# Patient Record
Sex: MALE | Race: AMERICAN INDIAN OR ALASKA NATIVE
[De-identification: names, ages, dates, MRNs, and addresses within clinical notes are randomized per-mention and may not be internally consistent; named-entity substitution may affect disease eponyms.]

---

## 2020-10-01 ENCOUNTER — HOSPITAL ENCOUNTER (EMERGENCY)
Dept: HOSPITAL 5 - ED | Age: 20
LOS: 1 days | Discharge: HOME | End: 2020-10-02
Payer: SELF-PAY

## 2020-10-01 DIAGNOSIS — F12.10: ICD-10-CM

## 2020-10-01 DIAGNOSIS — F17.200: ICD-10-CM

## 2020-10-01 DIAGNOSIS — R11.2: ICD-10-CM

## 2020-10-01 DIAGNOSIS — A08.4: ICD-10-CM

## 2020-10-01 DIAGNOSIS — Z79.899: ICD-10-CM

## 2020-10-01 DIAGNOSIS — K21.9: Primary | ICD-10-CM

## 2020-10-01 PROCEDURE — 96361 HYDRATE IV INFUSION ADD-ON: CPT

## 2020-10-01 PROCEDURE — 96375 TX/PRO/DX INJ NEW DRUG ADDON: CPT

## 2020-10-01 PROCEDURE — 80053 COMPREHEN METABOLIC PANEL: CPT

## 2020-10-01 PROCEDURE — 74177 CT ABD & PELVIS W/CONTRAST: CPT

## 2020-10-01 PROCEDURE — 85025 COMPLETE CBC W/AUTO DIFF WBC: CPT

## 2020-10-01 PROCEDURE — 36415 COLL VENOUS BLD VENIPUNCTURE: CPT

## 2020-10-01 PROCEDURE — 81001 URINALYSIS AUTO W/SCOPE: CPT

## 2020-10-01 PROCEDURE — 83690 ASSAY OF LIPASE: CPT

## 2020-10-01 PROCEDURE — 99284 EMERGENCY DEPT VISIT MOD MDM: CPT

## 2020-10-01 PROCEDURE — 96374 THER/PROPH/DIAG INJ IV PUSH: CPT

## 2020-10-02 VITALS — DIASTOLIC BLOOD PRESSURE: 61 MMHG | SYSTOLIC BLOOD PRESSURE: 139 MMHG

## 2020-10-02 LAB
ALBUMIN SERPL-MCNC: 5.9 G/DL (ref 3.9–5)
ALT SERPL-CCNC: 25 UNITS/L (ref 7–56)
BASOPHILS # (AUTO): 0 K/MM3 (ref 0–0.1)
BASOPHILS NFR BLD AUTO: 0.2 % (ref 0–1.8)
BILIRUB UR QL STRIP: (no result)
BLOOD UR QL VISUAL: (no result)
BUN SERPL-MCNC: 25 MG/DL (ref 9–20)
BUN/CREAT SERPL: 19 %
CALCIUM SERPL-MCNC: 11.9 MG/DL (ref 8.4–10.2)
EOSINOPHIL # BLD AUTO: 0 K/MM3 (ref 0–0.4)
EOSINOPHIL NFR BLD AUTO: 0 % (ref 0–4.3)
HCT VFR BLD CALC: 47.3 % (ref 35.5–45.6)
HEMOLYSIS INDEX: 4
HGB BLD-MCNC: 16.9 GM/DL (ref 11.8–15.2)
LYMPHOCYTES # BLD AUTO: 1.2 K/MM3 (ref 1.2–5.4)
LYMPHOCYTES NFR BLD AUTO: 7.5 % (ref 13.4–35)
MCHC RBC AUTO-ENTMCNC: 36 % (ref 32–34)
MCV RBC AUTO: 94 FL (ref 84–94)
MONOCYTES # (AUTO): 2 K/MM3 (ref 0–0.8)
MONOCYTES % (AUTO): 12.6 % (ref 0–7.3)
MUCOUS THREADS #/AREA URNS HPF: (no result) /HPF
PH UR STRIP: 5 [PH] (ref 5–7)
PLATELET # BLD: 314 K/MM3 (ref 140–440)
RBC # BLD AUTO: 5.04 M/MM3 (ref 3.65–5.03)
RBC #/AREA URNS HPF: 5 /HPF (ref 0–6)
UROBILINOGEN UR-MCNC: < 2 MG/DL (ref ?–2)
WBC #/AREA URNS HPF: 4 /HPF (ref 0–6)

## 2020-10-02 NOTE — CAT SCAN REPORT
CT ABDOMEN AND PELVIS WITH CONTRAST



HISTORY: Abdominal pain, nausea, vomiting



COMPARISON: None



TECHNIQUE: Routine abdominal and pelvic CT exam performed  following intravenous contrast administrat
ion. Patient received 100 mL IV Omnipaque 300. All CT scans at this location are performed using CT d
ose reduction for ALARA by means of automated exposure control.



FINDINGS:



CT ABDOMEN:

Lung Bases: No significant abnormality.

Liver: No significant abnormality.

Biliary: No significant abnormality.

Spleen: No significant abnormality.  Unenlarged.

Pancreas: No significant abnormality.

Adrenals: No significant abnormality.

Kidneys: No significant abnormality.

Lymphatics: No lymphadenopathy.

Vasculature: No significant abnormality. 

Bowel/Peritoneum: No significant abnormality. No free air. No free fluid. Appendix not visualized. No
 pericecal inflammation.



CT PELVIC:

: No significant abnormality.

Lymphatics: No lymphadenopathy.



Osseous Structures: No aggressive appearing osseous lesions.

Additional Findings: None



IMPRESSION:

1. No acute findings.



Signer Name: Jasvir Rey MD 

Signed: 10/2/2020 4:21 AM

Workstation Name: [id number]

## 2020-10-02 NOTE — EMERGENCY DEPARTMENT REPORT
ED N/V/D HPI





- General


Chief complaint: Abdominal Pain


Stated complaint: ABD PAIN


Source: patient


Mode of arrival: Ambulatory


Limitations: No Limitations





- History of Present Illness


Initial comments: 





Patient is a 20-year-old -American male with a history of GERD who prese

nts to the ED with complaint of acute onset persistent epigastric pain with 

intractable nausea and vomiting for the last 12 hours.  Patient states that he 

has not been able to keep anything down including water since the onset of the 

symptoms.  Patient states that last time he ate was over 24 hours ago.  Patient 

states that no one else at home is had similar symptoms.  Patient denies 

dizziness, syncope, chest pain, shortness of breath, diarrhea, dysuria, urinary 

frequency and urgency, hematemesis, hematochezia, headache, cough or hematuria 

and testicular pain.


MD complaint: nausea, vomiting, abdominal pain


-: Sudden, hour(s) (12)


Description of Vomiting: food contents, watery, bilious


Associated Abdominal Pain: Yes (epigastric)


Location: epigastric


Radiation: none


Severity: severe


Pain Scale: 7


Quality: cramping, sharp


Consistency: constant


Improves with: none


Worsens with: eating, vomiting


Context: possible food poisoning


Associated Symptoms: denies other symptoms, myalgias, loss of appetite, malaise,

nausea/vomiting.  denies: chest pain, cough, diaphoresis, fever/chills, 

headaches, rash, dysuria, shortness of breath, syncope, weakness, other





- Related Data


                                  Previous Rx's











 Medication  Instructions  Recorded  Last Taken  Type


 


Dicyclomine [Bentyl] 20 mg PO Q6H PRN #30 tablet 10/02/20 Unknown Rx


 


Famotidine [Pepcid] 20 mg PO Q12H #60 tablet 10/02/20 Unknown Rx


 


Ondansetron [Zofran Odt] 4 mg PO Q6HR PRN #20 tab.rapdis 10/02/20 Unknown Rx











                                    Allergies











Allergy/AdvReac Type Severity Reaction Status Date / Time


 


No Known Allergies Allergy   Unverified 10/02/20 00:05














ED Review of Systems


ROS: 


Stated complaint: ABD PAIN


Other details as noted in HPI





Constitutional: denies: chills, fever


Eyes: denies: eye pain, eye discharge, vision change


ENT: denies: ear pain, throat pain


Respiratory: denies: cough, shortness of breath, wheezing


Cardiovascular: denies: chest pain, palpitations


Endocrine: no symptoms reported


Gastrointestinal: abdominal pain, nausea, vomiting.  denies: diarrhea


Genitourinary: denies: urgency, dysuria


Musculoskeletal: denies: back pain, joint swelling, arthralgia


Skin: denies: rash, lesions


Neurological: denies: headache, weakness, paresthesias


Psychiatric: denies: anxiety, depression


Hematological/Lymphatic: denies: easy bleeding, easy bruising





ED Past Medical Hx





- Past Medical History


Previous Medical History?: Yes


Hx GERD: Yes


Additional medical history: Gastroenteritis





- Surgical History


Past Surgical History?: No





- Social History


Smoking Status: Current Every Day Smoker


Substance Use Type: Marijuana





- Medications


Home Medications: 


                                Home Medications











 Medication  Instructions  Recorded  Confirmed  Last Taken  Type


 


Dicyclomine [Bentyl] 20 mg PO Q6H PRN #30 tablet 10/02/20  Unknown Rx


 


Famotidine [Pepcid] 20 mg PO Q12H #60 tablet 10/02/20  Unknown Rx


 


Ondansetron [Zofran Odt] 4 mg PO Q6HR PRN #20 tab.rapdis 10/02/20  Unknown Rx














ED Physical Exam





- General


Limitations: No Limitations


General appearance: alert, in no apparent distress, anxious





- Head


Head exam: Present: atraumatic, normocephalic, normal inspection





- Eye


Eye exam: Present: normal appearance, PERRL, EOMI


Pupils: Present: normal accommodation





- ENT


ENT exam: Present: normal exam, normal orophraynx, mucous membranes moist, TM's 

normal bilaterally, normal external ear exam





- Neck


Neck exam: Present: normal inspection, full ROM.  Absent: tenderness





- Respiratory


Respiratory exam: Present: normal lung sounds bilaterally.  Absent: respiratory 

distress, wheezes, rales, stridor, chest wall tenderness, accessory muscle use, 

decreased breath sounds





- Cardiovascular


Cardiovascular Exam: Present: regular rate, normal rhythm, normal heart sounds. 

Absent: systolic murmur, diastolic murmur, rubs, gallop





- GI/Abdominal


GI/Abdominal exam: Present: soft, tenderness (epigastric tenderness), normal 

bowel sounds.  Absent: guarding, rebound, rigid, hyperactive bowel sounds, 

organomegaly





- Extremities Exam


Extremities exam: Present: normal inspection, full ROM, normal capillary refill





- Back Exam


Back exam: Present: normal inspection, full ROM.  Absent: tenderness, CVA 

tenderness (R), CVA tenderness (L), muscle spasm, paraspinal tenderness, vert

ebral tenderness





- Neurological Exam


Neurological exam: Present: alert, oriented X3, CN II-XII intact, normal gait, 

reflexes normal





- Psychiatric


Psychiatric exam: Present: normal affect, normal mood





- Skin


Skin exam: Present: warm, dry, intact, normal color.  Absent: rash





ED Course





                                   Vital Signs











  10/01/20





  23:58


 


Temperature 99.5 F


 


Pulse Rate 89


 


Respiratory 18





Rate 


 


Blood Pressure 139/61


 


O2 Sat by Pulse 99





Oximetry 














ED Medical Decision Making





- Lab Data


Result diagrams: 


                                 10/02/20 00:11





                                 10/02/20 00:11





- Radiology Data


Radiology results: report reviewed, image reviewed





Findings





[facility] 


[address] [place] [address] 





Cat Scan Report 


Signed 





 Patient: CESARIO METZGER MR#:  


 [id number] 


 : 2000 Acct:[id number] 





 Age/Sex: 20 / M ADM Date: 10/01/20 





 Loc: ED 


 Attending Dr: 








 Ordering Physician: YESSICA WILLIAM 


 Date of Service: 10/02/20 


 Procedure(s): CT abdomen pelvis w con 


 Accession Number(s): [id number] 





 cc: YESSICA WILLIAM 








 CT ABDOMEN AND PELVIS WITH CONTRAST 





HISTORY: Abdominal pain, nausea, vomiting 





COMPARISON: None 





TECHNIQUE: Routine abdominal and pelvic CT exam performed following intravenous 

contrast 


 administration. Patient received 100 mL IV Omnipaque 300. All CT scans at this 

location are 


 performed using CT dose reduction for ALARA by means of automated exposure 

control. 





FINDINGS: 





CT ABDOMEN: 


Lung Bases: No significant abnormality. 


Liver: No significant abnormality. 


Biliary: No significant abnormality. 


Spleen: No significant abnormality. Unenlarged. 


Pancreas: No significant abnormality. 


Adrenals: No significant abnormality. 


Kidneys: No significant abnormality. 


Lymphatics: No lymphadenopathy. 


Vasculature: No significant abnormality. 


Bowel/Peritoneum: No significant abnormality. No free air. No free fluid. 

Appendix not visualized. 


 No pericecal inflammation. 





CT PELVIC: 


: No significant abnormality. 


Lymphatics: No lymphadenopathy. 





Osseous Structures: No aggressive appearing osseous lesions. 


Additional Findings: None 





IMPRESSION: 


1. No acute findings. 





Signer Name: Jasvir Rey MD 


Signed: 10/2/2020 4:21 AM 


Workstation Name: [id number] 








 Transcribed By: IAN 


 Dictated By: Jasvir Rey MD 


 Electronically Authenticated By: Jasvir Rey MD 


 Signed Date/Time: 10/02/20 0421 











 DD/DT: 10/02/20 0419 


 TD/TT: 





- Medical Decision Making





This is a 20-year-old -American male with a history of GERD who presents 

to the ED with complaint of acute onset persistent epigastric pain with 

intractable nausea and vomiting for the last 12 hours.  Patient states that he 

has not been able to keep anything down including water since the onset of the 

symptoms.  Patient states that last time he ate was over 24 hours ago.  Patient 

states that no one else at home is had similar symptoms.  In the ED, patient is 

alert and oriented x3 and is not in distress but appears to be uncomfortable.  

Patient was treated for nausea and vomiting in the ED with antiemetics, also 

given pain medications, antacids and normal saline 1 L IV bolus x1.  Lab test 

results were reviewed and showed acute leukocytosis of 16,000, mild hypokalemia 

of 3.4 mmol/L, BUN of 25 and hypochloremia of 91.1 mmol/L.  Urinalysis 

unremarkable.  Abdomen pelvis CT scan with contrast showed no acute 

abnormalities.  On reevaluation, patient's nausea and vomiting resolved with 

medications.  Patient felt better and the pain resolved as well.  Patient kept 

oral fluids in the ED without nausea and vomiting after being treated.  Patient 

was discharged home on antiemetics, antacids and pain medications and was 

advised to maintain a clear liquid diet for 12 to 24 hours, and to follow-up 

with his primary care physician in 5 to 7 days for reevaluation.  Patient was 

also advised return to the ED immediately if symptoms get worse.





- Differential Diagnosis


GERD; Gastroenteritis; Cholelithiasis; Pancreatitis; Gastritis; Appendiciti


Critical care attestation.: 


If time is entered above; I have spent that time in minutes in the direct care 

of this critically ill patient, excluding procedure time.








ED Disposition


Clinical Impression: 


 Abdominal pain, acute, epigastric, Nausea and vomiting in adult patient, Viral 

gastroenteritis





GERD (gastroesophageal reflux disease)


Qualifiers:


 Esophagitis presence: without esophagitis Qualified Code(s): K21.9 - Gastro-

esophageal reflux disease without esophagitis





Disposition: -01 TO HOME OR SELFCARE


Is pt being admited?: No


Does the pt Need Aspirin: No


Condition: Stable


Instructions:  Gastroesophageal Reflux Disease (ED), Acute Nausea and Vomiting 

(ED), Gastroenteritis (ED)


Additional Instructions: 


Maintain a clear liquid diet for the next 12 to 24 hours, drink plenty of fluids

and take medication as needed for nausea and vomiting.  Return to the ED 

immediately if symptoms get worse.  Otherwise follow-up with your primary care 

physician in 3 to 5 days for reevaluation.


Prescriptions: 


Dicyclomine [Bentyl] 20 mg PO Q6H PRN #30 tablet


 PRN Reason: Abdominal pain


Famotidine [Pepcid] 20 mg PO Q12H #60 tablet


Ondansetron [Zofran Odt] 4 mg PO Q6HR PRN #20 tab.rapdis


 PRN Reason: Nausea


Referrals: 


[facility] [Provider Group] - 3-5 Days


Forms:  Work/School Release Form(ED)


Time of Disposition: 04:41


Print Language: ENGLISH